# Patient Record
Sex: FEMALE | Race: BLACK OR AFRICAN AMERICAN | NOT HISPANIC OR LATINO
[De-identification: names, ages, dates, MRNs, and addresses within clinical notes are randomized per-mention and may not be internally consistent; named-entity substitution may affect disease eponyms.]

---

## 2017-11-14 ENCOUNTER — APPOINTMENT (OUTPATIENT)
Dept: OBGYN | Facility: CLINIC | Age: 67
End: 2017-11-14
Payer: MEDICARE

## 2017-11-14 ENCOUNTER — LABORATORY RESULT (OUTPATIENT)
Age: 67
End: 2017-11-14

## 2017-11-14 VITALS
BODY MASS INDEX: 24.59 KG/M2 | WEIGHT: 166 LBS | HEIGHT: 69 IN | SYSTOLIC BLOOD PRESSURE: 120 MMHG | DIASTOLIC BLOOD PRESSURE: 80 MMHG

## 2017-11-14 PROCEDURE — G0101: CPT

## 2018-01-05 LAB — CYTOLOGY CVX/VAG DOC THIN PREP: NORMAL

## 2018-02-02 ENCOUNTER — APPOINTMENT (OUTPATIENT)
Dept: GYNECOLOGIC ONCOLOGY | Facility: CLINIC | Age: 68
End: 2018-02-02
Payer: MEDICARE

## 2018-02-02 VITALS
BODY MASS INDEX: 24.44 KG/M2 | SYSTOLIC BLOOD PRESSURE: 166 MMHG | WEIGHT: 165 LBS | DIASTOLIC BLOOD PRESSURE: 97 MMHG | HEART RATE: 69 BPM | HEIGHT: 69 IN

## 2018-02-02 DIAGNOSIS — E03.9 HYPOTHYROIDISM, UNSPECIFIED: ICD-10-CM

## 2018-02-02 DIAGNOSIS — Z87.411 PERSONAL HISTORY OF VAGINAL DYSPLASIA: ICD-10-CM

## 2018-02-02 DIAGNOSIS — G25.0 ESSENTIAL TREMOR: ICD-10-CM

## 2018-02-02 DIAGNOSIS — Z01.419 ENCOUNTER FOR GYNECOLOGICAL EXAMINATION (GENERAL) (ROUTINE) W/OUT ABNORMAL FINDINGS: ICD-10-CM

## 2018-02-02 DIAGNOSIS — D51.0 VITAMIN B12 DEFICIENCY ANEMIA DUE TO INTRINSIC FACTOR DEFICIENCY: ICD-10-CM

## 2018-02-02 DIAGNOSIS — R87.621 ATYPICAL SQUAMOUS CELLS CANNOT EXCLUDE HIGH GRADE SQUAMOUS INTRAEPITHELIAL LESION ON CYTOLOGIC SMEAR OF VAGINA (ASC-H): ICD-10-CM

## 2018-02-02 PROCEDURE — 99203 OFFICE O/P NEW LOW 30 MIN: CPT | Mod: 25

## 2018-02-02 PROCEDURE — 57421 EXAM/BIOPSY OF VAG W/SCOPE: CPT

## 2018-02-02 RX ORDER — TIZANIDINE 4 MG/1
4 TABLET ORAL
Refills: 0 | Status: DISCONTINUED | COMMUNITY
End: 2018-02-02

## 2018-02-08 LAB — CORE LAB BIOPSY: NORMAL

## 2018-03-16 ENCOUNTER — OUTPATIENT (OUTPATIENT)
Dept: OUTPATIENT SERVICES | Facility: HOSPITAL | Age: 68
LOS: 1 days | End: 2018-03-16
Payer: MEDICARE

## 2018-03-16 VITALS
DIASTOLIC BLOOD PRESSURE: 84 MMHG | SYSTOLIC BLOOD PRESSURE: 132 MMHG | HEART RATE: 68 BPM | RESPIRATION RATE: 16 BRPM | TEMPERATURE: 98 F | WEIGHT: 169.98 LBS | HEIGHT: 69.5 IN

## 2018-03-16 DIAGNOSIS — R87.621 ATYPICAL SQUAMOUS CELLS CANNOT EXCLUDE HIGH GRADE SQUAMOUS INTRAEPITHELIAL LESION ON CYTOLOGIC SMEAR OF VAGINA (ASC-H): ICD-10-CM

## 2018-03-16 DIAGNOSIS — Z87.411 PERSONAL HISTORY OF VAGINAL DYSPLASIA: Chronic | ICD-10-CM

## 2018-03-16 DIAGNOSIS — M50.20 OTHER CERVICAL DISC DISPLACEMENT, UNSPECIFIED CERVICAL REGION: ICD-10-CM

## 2018-03-16 DIAGNOSIS — H35.60 RETINAL HEMORRHAGE, UNSPECIFIED EYE: ICD-10-CM

## 2018-03-16 DIAGNOSIS — E03.9 HYPOTHYROIDISM, UNSPECIFIED: ICD-10-CM

## 2018-03-16 LAB
BLD GP AB SCN SERPL QL: NEGATIVE — SIGNIFICANT CHANGE UP
BUN SERPL-MCNC: 17 MG/DL — SIGNIFICANT CHANGE UP (ref 7–23)
CALCIUM SERPL-MCNC: 9.3 MG/DL — SIGNIFICANT CHANGE UP (ref 8.4–10.5)
CHLORIDE SERPL-SCNC: 100 MMOL/L — SIGNIFICANT CHANGE UP (ref 98–107)
CO2 SERPL-SCNC: 28 MMOL/L — SIGNIFICANT CHANGE UP (ref 22–31)
CREAT SERPL-MCNC: 0.95 MG/DL — SIGNIFICANT CHANGE UP (ref 0.5–1.3)
GLUCOSE SERPL-MCNC: 88 MG/DL — SIGNIFICANT CHANGE UP (ref 70–99)
HCT VFR BLD CALC: 37.2 % — SIGNIFICANT CHANGE UP (ref 34.5–45)
HGB BLD-MCNC: 12.1 G/DL — SIGNIFICANT CHANGE UP (ref 11.5–15.5)
MCHC RBC-ENTMCNC: 30.6 PG — SIGNIFICANT CHANGE UP (ref 27–34)
MCHC RBC-ENTMCNC: 32.5 % — SIGNIFICANT CHANGE UP (ref 32–36)
MCV RBC AUTO: 94.2 FL — SIGNIFICANT CHANGE UP (ref 80–100)
NRBC # FLD: 0 — SIGNIFICANT CHANGE UP
PLATELET # BLD AUTO: 266 K/UL — SIGNIFICANT CHANGE UP (ref 150–400)
PMV BLD: 10.9 FL — SIGNIFICANT CHANGE UP (ref 7–13)
POTASSIUM SERPL-MCNC: 4.4 MMOL/L — SIGNIFICANT CHANGE UP (ref 3.5–5.3)
POTASSIUM SERPL-SCNC: 4.4 MMOL/L — SIGNIFICANT CHANGE UP (ref 3.5–5.3)
RBC # BLD: 3.95 M/UL — SIGNIFICANT CHANGE UP (ref 3.8–5.2)
RBC # FLD: 14.3 % — SIGNIFICANT CHANGE UP (ref 10.3–14.5)
RH IG SCN BLD-IMP: POSITIVE — SIGNIFICANT CHANGE UP
SODIUM SERPL-SCNC: 139 MMOL/L — SIGNIFICANT CHANGE UP (ref 135–145)
WBC # BLD: 6.6 K/UL — SIGNIFICANT CHANGE UP (ref 3.8–10.5)
WBC # FLD AUTO: 6.6 K/UL — SIGNIFICANT CHANGE UP (ref 3.8–10.5)

## 2018-03-16 PROCEDURE — 93010 ELECTROCARDIOGRAM REPORT: CPT

## 2018-03-16 RX ORDER — UBIDECARENONE 100 MG
200 CAPSULE ORAL
Qty: 0 | Refills: 0 | COMMUNITY

## 2018-03-16 NOTE — H&P PST ADULT - NEGATIVE NEUROLOGICAL SYMPTOMS
no vertigo/no loss of sensation/no facial palsy/no weakness/no difficulty walking/no confusion/no transient paralysis/no hemiparesis/no generalized seizures/no focal seizures/no syncope

## 2018-03-16 NOTE — H&P PST ADULT - PSH
Benign breast cyst in female  left 1977  H/O vaginal dysplasia  partial vaginectomy 2012  History of hysterectomy  1987  Normal vaginal delivery  x1

## 2018-03-16 NOTE — H&P PST ADULT - NEGATIVE OPHTHALMOLOGIC SYMPTOMS
no diplopia/no photophobia/no blurred vision R/no loss of vision R/no blurred vision L/no loss of vision L

## 2018-03-16 NOTE — H&P PST ADULT - PRIMARY CARE PROVIDER
Dr Osorio  Dr Osorio      Dr Elliott opthalmology [phone] Dr Osorio      Dr Elliott opthalmology     [phone]

## 2018-03-16 NOTE — H&P PST ADULT - NSANTHOSAYNRD_GEN_A_CORE
No. FEDER[place] screening performed.  STOP BANG Legend: 0-2 = LOW Risk; 3-4 = INTERMEDIATE Risk; 5-8 = HIGH Risk

## 2018-03-16 NOTE — H&P PST ADULT - RS GEN PE MLT RESP DETAILS PC
no chest wall tenderness/no rhonchi/respirations non-labored/no wheezes/clear to auscultation bilaterally/airway patent/good air movement/no rales/breath sounds equal

## 2018-03-16 NOTE — H&P PST ADULT - FAMILY HISTORY
Family history of stroke     Mother  Still living? Unknown  Family history of hypertension, Age at diagnosis: Age Unknown     Sibling  Still living? Unknown  Family history of hypertension, Age at diagnosis: Age Unknown
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
## 2018-03-16 NOTE — H&P PST ADULT - HISTORY OF PRESENT ILLNESS
67 year old female present to presurgical testing with diagnosis of atypical squamous cells cannot exclude high grade squamous intraepithelial lesion on cytologic smear of vagina (ASC-H) scheduled for partial vaginectomy, CO2 laser ablation of vaginal dysplasia for 3/28/18. Pt has a long standing hx of vaginal dysplasia, s/p partial vaginectomy in 2012, found abnormal cells on routine follow up examination, and recommended surgical intervention. Pt denies abnormal vaginal bleeding or pelvic pain.

## 2018-03-16 NOTE — H&P PST ADULT - PROBLEM SELECTOR PLAN 2
discussed with Dr Wong anesthesiologist. Pending opthalmology evaluation. Pt has an appt on 3/19/18.

## 2018-03-16 NOTE — H&P PST ADULT - NEGATIVE ENMT SYMPTOMS
no nose bleeds/no vertigo/no sinus symptoms/no throat pain/no dysphagia/no hearing difficulty/no tinnitus/no ear pain

## 2018-03-16 NOTE — H&P PST ADULT - MUSCULOSKELETAL
detailed exam no joint swelling/no joint erythema/normal strength/no joint warmth/no calf tenderness/ROM intact details…

## 2018-03-16 NOTE — H&P PST ADULT - PROBLEM SELECTOR PROBLEM 1
Atypical squamous cells cannot exclude high grade squamous intraepithelial lesion on cytologic smear of vagina (ASC-H)

## 2018-03-16 NOTE — H&P PST ADULT - PROBLEM SELECTOR PLAN 1
Pt is scheduled for partial vaginectomy, CO2 laser ablation of vaginal dysplasia for 3/28/18. Preop instructions, pepcid provided. Pt stated understanding. Pending medical evaluation due to Hx of HTN (not on medication) HLD pernicious anemia, rare palpitations, and last echo and stress test. Instructed to take nadolol on the morning of procedure with a sip of water.
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
## 2018-03-27 ENCOUNTER — TRANSCRIPTION ENCOUNTER (OUTPATIENT)
Age: 68
End: 2018-03-27

## 2018-03-28 ENCOUNTER — OUTPATIENT (OUTPATIENT)
Dept: OUTPATIENT SERVICES | Facility: HOSPITAL | Age: 68
LOS: 1 days | Discharge: HOME | End: 2018-03-28
Payer: MEDICARE

## 2018-03-28 ENCOUNTER — APPOINTMENT (OUTPATIENT)
Dept: GYNECOLOGIC ONCOLOGY | Facility: HOSPITAL | Age: 68
End: 2018-03-28

## 2018-03-28 ENCOUNTER — RESULT REVIEW (OUTPATIENT)
Age: 68
End: 2018-03-28

## 2018-03-28 VITALS
RESPIRATION RATE: 15 BRPM | HEART RATE: 69 BPM | DIASTOLIC BLOOD PRESSURE: 84 MMHG | SYSTOLIC BLOOD PRESSURE: 159 MMHG | TEMPERATURE: 98 F | OXYGEN SATURATION: 99 %

## 2018-03-28 VITALS
OXYGEN SATURATION: 100 % | WEIGHT: 98.11 LBS | RESPIRATION RATE: 14 BRPM | TEMPERATURE: 98 F | SYSTOLIC BLOOD PRESSURE: 183 MMHG | HEIGHT: 69 IN | DIASTOLIC BLOOD PRESSURE: 103 MMHG | HEART RATE: 73 BPM

## 2018-03-28 DIAGNOSIS — Z87.411 PERSONAL HISTORY OF VAGINAL DYSPLASIA: Chronic | ICD-10-CM

## 2018-03-28 DIAGNOSIS — R87.621 ATYPICAL SQUAMOUS CELLS CANNOT EXCLUDE HIGH GRADE SQUAMOUS INTRAEPITHELIAL LESION ON CYTOLOGIC SMEAR OF VAGINA (ASC-H): ICD-10-CM

## 2018-03-28 PROCEDURE — 57061 DESTRUCTION VAG LESIONS SMPL: CPT | Mod: 59,GC

## 2018-03-28 PROCEDURE — 57106 VAGNC PRTL RMVL VAG WALL: CPT | Mod: GC

## 2018-03-28 PROCEDURE — 88305 TISSUE EXAM BY PATHOLOGIST: CPT | Mod: 26

## 2018-03-28 RX ORDER — PYRIDOXINE HCL (VITAMIN B6) 100 MG
1 TABLET ORAL
Qty: 0 | Refills: 0 | COMMUNITY

## 2018-03-28 RX ORDER — UBIDECARENONE 100 MG
200 CAPSULE ORAL
Qty: 0 | Refills: 0 | COMMUNITY

## 2018-03-28 RX ORDER — LEVOTHYROXINE SODIUM 125 MCG
1 TABLET ORAL
Qty: 0 | Refills: 0 | COMMUNITY

## 2018-03-28 RX ORDER — SODIUM CHLORIDE 9 MG/ML
1000 INJECTION, SOLUTION INTRAVENOUS
Qty: 0 | Refills: 0 | Status: DISCONTINUED | OUTPATIENT
Start: 2018-03-28 | End: 2018-03-29

## 2018-03-28 RX ORDER — NADOLOL 80 MG/1
2 TABLET ORAL
Qty: 0 | Refills: 0 | COMMUNITY

## 2018-03-28 RX ORDER — ASPIRIN/CALCIUM CARB/MAGNESIUM 324 MG
1 TABLET ORAL
Qty: 0 | Refills: 0 | COMMUNITY

## 2018-03-28 RX ORDER — ACETAMINOPHEN 500 MG
1 TABLET ORAL
Qty: 0 | Refills: 0 | COMMUNITY

## 2018-03-28 RX ORDER — CHOLECALCIFEROL (VITAMIN D3) 125 MCG
2 CAPSULE ORAL
Qty: 0 | Refills: 0 | COMMUNITY

## 2018-03-28 RX ORDER — IBUPROFEN 200 MG
0 TABLET ORAL
Qty: 0 | Refills: 0 | COMMUNITY

## 2018-03-28 NOTE — BRIEF OPERATIVE NOTE - PROCEDURE
<<-----Click on this checkbox to enter Procedure CO2 laser surgery  03/28/2018  of vagina  Active  TZUBKINA  Vaginectomy, partial  03/28/2018  right  Active  TZUBKINA

## 2018-03-28 NOTE — ASU DISCHARGE PLAN (ADULT/PEDIATRIC). - COMMENTS
Surgical Unit will call you on the next business day to follow up. [facility] is open Monday - Friday.

## 2018-03-28 NOTE — ASU DISCHARGE PLAN (ADULT/PEDIATRIC). - ACTIVITY LEVEL
no tampons/nothing per vagina/no douching/no tub baths/no intercourse/1 month nothing per vagina/no tampons/no exercise/1 month/no tub baths/no douching/no heavy lifting/no sports/gym/no intercourse

## 2018-03-28 NOTE — ASU DISCHARGE PLAN (ADULT/PEDIATRIC). - NOTIFY
Inability to Tolerate Liquids or Foods/Pain not relieved by Medications/GYN Fever>100.4/Bleeding that does not stop Unable to Urinate/Pain not relieved by Medications/GYN Fever>100.4/Inability to Tolerate Liquids or Foods/Persistent Nausea and Vomiting/Bleeding that does not stop

## 2018-03-28 NOTE — ASU DISCHARGE PLAN (ADULT/PEDIATRIC). - MEDICATION SUMMARY - MEDICATIONS TO TAKE
I will START or STAY ON the medications listed below when I get home from the hospital:    cyonacobalamin injection monthly  -- Indication: For home med    Tylenol 325 mg oral capsule  -- 1 cap(s) by mouth 3 times a day  -- Indication: For pain med    Motrin  -- Indication: For pain med    Aspirin Enteric Coated 81 mg oral delayed release tablet  -- 1 tab(s) by mouth once a day. LASt dose 3/20/18  -- Indication: For home med    nadolol 20 mg oral tablet  -- 2 tab(s) by mouth once a day  -- Indication: For home med    CoQ10  -- 200 milligram(s) by mouth once a day. LAst dose 3/20/18  -- Indication: For home med    Synthroid 125 mcg (0.125 mg) oral tablet  -- 1 tab(s) by mouth once a day  -- Indication: For home med    Vitamin D3 2000 intl units oral tablet  -- 2 tab(s) by mouth once a day  -- Indication: For home med    Vitamin B6 100 mg oral tablet  -- 1 tab(s) by mouth once a day  -- Indication: For home med

## 2018-03-28 NOTE — ASU DISCHARGE PLAN (ADULT/PEDIATRIC). - NURSING INSTRUCTIONS
See medication reconciliation record  You were given IV Tylenol for pain management.  Please DO NOT take tylenol for the next 6-8 hours (after 10pm ). Please do not exceed 3000mg in 24hours.

## 2018-04-10 ENCOUNTER — APPOINTMENT (OUTPATIENT)
Dept: GYNECOLOGIC ONCOLOGY | Facility: CLINIC | Age: 68
End: 2018-04-10
Payer: MEDICARE

## 2018-04-10 VITALS
HEART RATE: 78 BPM | HEIGHT: 69 IN | TEMPERATURE: 98.6 F | SYSTOLIC BLOOD PRESSURE: 144 MMHG | WEIGHT: 166 LBS | BODY MASS INDEX: 24.59 KG/M2 | DIASTOLIC BLOOD PRESSURE: 87 MMHG

## 2018-04-10 PROCEDURE — 99024 POSTOP FOLLOW-UP VISIT: CPT | Mod: NC

## 2018-04-13 LAB — SURGICAL PATHOLOGY STUDY: SIGNIFICANT CHANGE UP

## 2018-07-17 PROBLEM — H35.60 RETINAL HEMORRHAGE, UNSPECIFIED EYE: Chronic | Status: ACTIVE | Noted: 2018-03-16

## 2018-10-23 PROBLEM — M54.30 SCIATICA, UNSPECIFIED SIDE: Chronic | Status: ACTIVE | Noted: 2018-03-16

## 2018-10-23 PROBLEM — R87.621 ATYPICAL SQUAMOUS CELLS CANNOT EXCLUDE HIGH GRADE SQUAMOUS INTRAEPITHELIAL LESION ON CYTOLOGIC SMEAR OF VAGINA (ASC-H): Chronic | Status: ACTIVE | Noted: 2018-03-16

## 2018-10-23 PROBLEM — M50.20 OTHER CERVICAL DISC DISPLACEMENT, UNSPECIFIED CERVICAL REGION: Chronic | Status: ACTIVE | Noted: 2018-03-16
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
## 2018-10-23 PROBLEM — I10 ESSENTIAL (PRIMARY) HYPERTENSION: Chronic | Status: ACTIVE | Noted: 2018-03-16

## 2018-10-23 PROBLEM — M51.26 OTHER INTERVERTEBRAL DISC DISPLACEMENT, LUMBAR REGION: Chronic | Status: ACTIVE | Noted: 2018-03-16

## 2018-10-26 ENCOUNTER — APPOINTMENT (OUTPATIENT)
Dept: GYNECOLOGIC ONCOLOGY | Facility: CLINIC | Age: 68
End: 2018-10-26
Payer: MEDICARE

## 2018-10-26 VITALS
HEART RATE: 90 BPM | BODY MASS INDEX: 25.92 KG/M2 | SYSTOLIC BLOOD PRESSURE: 139 MMHG | DIASTOLIC BLOOD PRESSURE: 84 MMHG | WEIGHT: 175 LBS | HEIGHT: 69 IN

## 2018-10-26 PROCEDURE — 57420 EXAM OF VAGINA W/SCOPE: CPT

## 2018-11-27 ENCOUNTER — APPOINTMENT (OUTPATIENT)
Dept: OBGYN | Facility: CLINIC | Age: 68
End: 2018-11-27
Payer: MEDICARE

## 2018-11-27 ENCOUNTER — LABORATORY RESULT (OUTPATIENT)
Age: 68
End: 2018-11-27

## 2018-11-27 VITALS
BODY MASS INDEX: 25.18 KG/M2 | HEIGHT: 69 IN | DIASTOLIC BLOOD PRESSURE: 100 MMHG | WEIGHT: 170 LBS | SYSTOLIC BLOOD PRESSURE: 172 MMHG

## 2018-11-27 PROCEDURE — 99213 OFFICE O/P EST LOW 20 MIN: CPT

## 2018-11-27 RX ORDER — MELOXICAM 15 MG/1
TABLET ORAL
Refills: 0 | Status: DISCONTINUED | COMMUNITY
End: 2018-11-27

## 2018-11-27 RX ORDER — LOSARTAN POTASSIUM 50 MG/1
50 TABLET, FILM COATED ORAL
Refills: 0 | Status: DISCONTINUED | COMMUNITY
End: 2018-11-27

## 2018-12-11 ENCOUNTER — MEDICATION RENEWAL (OUTPATIENT)
Age: 68
End: 2018-12-11

## 2018-12-11 DIAGNOSIS — Z86.19 PERSONAL HISTORY OF OTHER INFECTIOUS AND PARASITIC DISEASES: ICD-10-CM

## 2018-12-11 LAB — CYTOLOGY CVX/VAG DOC THIN PREP: NORMAL

## 2019-07-11 NOTE — H&P PST ADULT - NSWEIGHTCALCTOOLDRUG_GEN_A_CORE
Patient mother states she would like to know if patient could get in sooner for wrist pain prior to her current appointment on 8/7.    Patient's mother states she does scheduling for patient because patient is deaf.    [phone]  
Patient was seen at [facility] for wrist pain. Appointment no longer needed. Patient requested 8/9/2019 appointment be cancelled also as they will follow up with [facility] also.   
 used

## 2019-12-02 ENCOUNTER — LABORATORY RESULT (OUTPATIENT)
Age: 69
End: 2019-12-02

## 2019-12-02 ENCOUNTER — APPOINTMENT (OUTPATIENT)
Dept: OBGYN | Facility: CLINIC | Age: 69
End: 2019-12-02
Payer: MEDICARE

## 2019-12-02 VITALS
SYSTOLIC BLOOD PRESSURE: 130 MMHG | DIASTOLIC BLOOD PRESSURE: 80 MMHG | BODY MASS INDEX: 25.48 KG/M2 | WEIGHT: 172 LBS | HEIGHT: 69 IN

## 2019-12-02 DIAGNOSIS — I10 ESSENTIAL (PRIMARY) HYPERTENSION: ICD-10-CM

## 2019-12-02 PROCEDURE — G0101: CPT

## 2019-12-02 RX ORDER — CONJUGATED ESTROGENS 0.62 MG/G
0.62 CREAM VAGINAL
Qty: 30 | Refills: 2 | Status: DISCONTINUED | COMMUNITY
Start: 2018-04-10 | End: 2019-12-02

## 2019-12-02 RX ORDER — FLUCONAZOLE 150 MG/1
150 TABLET ORAL
Qty: 1 | Refills: 1 | Status: DISCONTINUED | COMMUNITY
Start: 2018-12-11 | End: 2019-12-02

## 2019-12-10 LAB — CYTOLOGY CVX/VAG DOC THIN PREP: ABNORMAL

## 2020-12-09 ENCOUNTER — APPOINTMENT (OUTPATIENT)
Dept: OBGYN | Facility: CLINIC | Age: 70
End: 2020-12-09
Payer: MEDICARE

## 2020-12-09 ENCOUNTER — LABORATORY RESULT (OUTPATIENT)
Age: 70
End: 2020-12-09

## 2020-12-09 VITALS
WEIGHT: 168 LBS | TEMPERATURE: 94.8 F | HEIGHT: 55 IN | BODY MASS INDEX: 38.88 KG/M2 | DIASTOLIC BLOOD PRESSURE: 78 MMHG | SYSTOLIC BLOOD PRESSURE: 120 MMHG

## 2020-12-09 DIAGNOSIS — Z01.419 ENCOUNTER FOR GYNECOLOGICAL EXAMINATION (GENERAL) (ROUTINE) W/OUT ABNORMAL FINDINGS: ICD-10-CM

## 2020-12-09 PROCEDURE — G0101: CPT

## 2020-12-16 PROBLEM — Z86.19 HISTORY OF CANDIDIASIS OF VAGINA: Status: RESOLVED | Noted: 2018-12-11 | Resolved: 2020-12-16

## 2020-12-18 LAB — CYTOLOGY CVX/VAG DOC THIN PREP: ABNORMAL

## 2021-12-06 ENCOUNTER — LABORATORY RESULT (OUTPATIENT)
Age: 71
End: 2021-12-06

## 2021-12-06 ENCOUNTER — APPOINTMENT (OUTPATIENT)
Dept: OBGYN | Facility: CLINIC | Age: 71
End: 2021-12-06
Payer: MEDICARE

## 2021-12-06 VITALS
BODY MASS INDEX: 23.74 KG/M2 | SYSTOLIC BLOOD PRESSURE: 120 MMHG | HEIGHT: 69.5 IN | DIASTOLIC BLOOD PRESSURE: 84 MMHG | WEIGHT: 164 LBS

## 2021-12-06 DIAGNOSIS — Z01.419 ENCOUNTER FOR GYNECOLOGICAL EXAMINATION (GENERAL) (ROUTINE) W/OUT ABNORMAL FINDINGS: ICD-10-CM

## 2021-12-06 PROCEDURE — G0101: CPT

## 2021-12-12 RX ORDER — LOSARTAN POTASSIUM 100 MG/1
TABLET, FILM COATED ORAL
Refills: 0 | Status: DISCONTINUED | COMMUNITY
End: 2021-12-12

## 2021-12-12 RX ORDER — OLMESARTAN MEDOXOMIL 40 MG/1
TABLET, FILM COATED ORAL
Refills: 0 | Status: ACTIVE | COMMUNITY

## 2022-07-16 ENCOUNTER — NON-APPOINTMENT (OUTPATIENT)
Age: 72
End: 2022-07-16

## 2022-07-22 ENCOUNTER — APPOINTMENT (OUTPATIENT)
Dept: ORTHOPEDIC SURGERY | Facility: CLINIC | Age: 72
End: 2022-07-22

## 2022-12-20 ENCOUNTER — APPOINTMENT (OUTPATIENT)
Dept: OBGYN | Facility: CLINIC | Age: 72
End: 2022-12-20

## 2022-12-20 DIAGNOSIS — R87.620 ATYPICAL SQUAMOUS CELLS OF UNDETERMINED SIGNIFICANCE ON CYTOLOGIC SMEAR OF VAGINA (ASC-US): ICD-10-CM

## 2022-12-20 DIAGNOSIS — Z11.51 ENCOUNTER FOR SCREENING FOR HUMAN PAPILLOMAVIRUS (HPV): ICD-10-CM

## 2022-12-20 PROCEDURE — G0101: CPT

## 2022-12-20 RX ORDER — NADOLOL 40 MG/1
TABLET ORAL
Refills: 0 | Status: DISCONTINUED | COMMUNITY
End: 2022-12-20

## 2022-12-24 RX ORDER — NADOLOL 80 MG/1
TABLET ORAL
Refills: 0 | Status: ACTIVE | COMMUNITY

## 2022-12-26 LAB — HPV HIGH+LOW RISK DNA PNL CVX: NOT DETECTED

## 2022-12-27 ENCOUNTER — APPOINTMENT (OUTPATIENT)
Dept: ORTHOPEDIC SURGERY | Facility: CLINIC | Age: 72
End: 2022-12-27

## 2022-12-27 VITALS — WEIGHT: 164 LBS | HEIGHT: 69.5 IN | BODY MASS INDEX: 23.74 KG/M2

## 2022-12-27 DIAGNOSIS — M51.26 OTHER INTERVERTEBRAL DISC DISPLACEMENT, LUMBAR REGION: ICD-10-CM

## 2022-12-27 DIAGNOSIS — M54.16 RADICULOPATHY, LUMBAR REGION: ICD-10-CM

## 2022-12-27 DIAGNOSIS — M48.062 SPINAL STENOSIS, LUMBAR REGION WITH NEUROGENIC CLAUDICATION: ICD-10-CM

## 2022-12-27 DIAGNOSIS — M47.816 SPONDYLOSIS W/OUT MYELOPATHY OR RADICULOPATHY, LUMBAR REGION: ICD-10-CM

## 2022-12-27 PROCEDURE — 99205 OFFICE O/P NEW HI 60 MIN: CPT | Mod: 25

## 2022-12-27 PROCEDURE — 20552 NJX 1/MLT TRIGGER POINT 1/2: CPT

## 2022-12-27 PROCEDURE — 72170 X-RAY EXAM OF PELVIS: CPT

## 2022-12-27 PROCEDURE — 72110 X-RAY EXAM L-2 SPINE 4/>VWS: CPT

## 2022-12-27 PROCEDURE — J3490N: CUSTOM

## 2022-12-27 NOTE — HISTORY OF PRESENT ILLNESS
[Lower back] : lower back [Gradual] : gradual [10] : 10 [8] : 8 [Burning] : burning [Dull/Aching] : dull/aching [Constant] : constant [Meds] : meds [de-identified] : 12/27/22:  71 yo F here with severe pain in the left hip and down the left leg - the right side not as bad - has had symptoms about a year - pain present both with rest and with activity \par \par EMG/NCS 91/22 - predominatently sensory polyneuopathy, mild acute right S1 radiculopathy \par \par MRi L spine 7/29/22 [facility] (we don’t have the disc) - grade 1 ds at L3-4 and L4-5 - progression of stenosis at L3-4 and L4-5 \par \par xrays today:\par L spine - grade 1 ds at L3-4 and L4-5 \par ap pelvis - narrowing of the hips \par \par tried mobic/tylenol - no relief \par tried PT\par No chiro/accupuncture\par No injections \par no use of cane/brace \par no prior spinal surgery\par \par retired \par \par hypothryoid/HTN\par No hx of cancer \par No loss of bb control  [] : no [FreeTextEntry5] :  CHRISSIE TSANG is a 72 year female who is here today for lower back pain. states she has been having pain on and off for several years. She was last seen in July for pain and had MRI done. Pain has been getting constant and worse since Friday 12/23/22.  [FreeTextEntry7] : down the legs, left > right

## 2022-12-27 NOTE — DISCUSSION/SUMMARY
[de-identified] : reviewed the case and the imaging \par discussion of tx optoins \par tpI TOLERATED WELL \par DS with stenosis from L3-5 \par rec paiN lesi/GABAPENTIN -sending the gabapentin today\par WOULD BE CANDIDATE FOR SURGERY FROM l3-5 POST BASED OPERATION LAMI/FUSION \par we discussed the surgery in detail today \par shes not ready for surgery \par would like to try more PT \par referral for pain management \par \par d/w the patient for laminecotomy and/or fusion at L3-5 \par \par We've discussed the surgery details including instrumentation and grafting options (local, allograft, ICBG, and biologics) as well as potential for complications including but not limited to pain, scar and infection. There is also a possibility for hardware complication such as malposition of hardware,hardware loosening, pullout, failure or fracture of bone, adjacent segment disease,pseudarthrosis, and need for future surgery. We discussed potential for injury to nerves, spinal cord or blood vessels, paralysis, blindness, need for transfusion, general anesthesia, allergic reaction, prolonged intubation,myocardial infarction, stroke, deep venous thrombosis, pulmonary embolus, and death.  Spinal fluid leak may occur and may require prolonged time in the hospital and also further surgical procedures including drain placement.  The patient understands these things and all questions are answered to his/her satisfaction.\par \par The patient will need a medical clearance and pre-admission testing prior to surgery\par \par We will use neuromonitoring in order to keep things as safe as possilble.\par \par The procedure does not come with a guarantee of success or of satisfaction on the patient's behalf \par \par \par At the surgeon's discretion he may call for assistance during the surgery or in the perioperative period \par \par \par \par

## 2023-04-24 ENCOUNTER — FORM ENCOUNTER (OUTPATIENT)
Age: 73
End: 2023-04-24

## 2023-04-24 ENCOUNTER — RESULT CHARGE (OUTPATIENT)
Age: 73
End: 2023-04-24

## 2023-04-24 ENCOUNTER — APPOINTMENT (OUTPATIENT)
Dept: ORTHOPEDIC SURGERY | Facility: CLINIC | Age: 73
End: 2023-04-24
Payer: MEDICARE

## 2023-04-24 DIAGNOSIS — M54.2 CERVICALGIA: ICD-10-CM

## 2023-04-24 PROCEDURE — 99214 OFFICE O/P EST MOD 30 MIN: CPT

## 2023-04-24 PROCEDURE — 72050 X-RAY EXAM NECK SPINE 4/5VWS: CPT

## 2023-04-24 NOTE — DISCUSSION/SUMMARY
[de-identified] : has pain neck/back/B shoulders \par  reviewed the case/imaging with her for the cervical/lumbar \par discussion of tx options \par  we discussed PT which she would like to continue with \par for the neck pain - referral [facility] \par WOULD REC f/u with pain management \par not interseted in surgery \par \par for her shoulders she could f/u with shoulder ervice  if needed
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
## 2023-04-25 ENCOUNTER — APPOINTMENT (OUTPATIENT)
Dept: MRI IMAGING | Facility: CLINIC | Age: 73
End: 2023-04-25
Payer: MEDICARE

## 2023-04-25 PROCEDURE — 72141 MRI NECK SPINE W/O DYE: CPT | Mod: MH

## 2023-05-22 ENCOUNTER — APPOINTMENT (OUTPATIENT)
Dept: ORTHOPEDIC SURGERY | Facility: CLINIC | Age: 73
End: 2023-05-22
Payer: MEDICARE

## 2023-05-22 VITALS — HEIGHT: 69.5 IN | WEIGHT: 164 LBS | BODY MASS INDEX: 23.74 KG/M2

## 2023-05-22 DIAGNOSIS — M47.812 SPONDYLOSIS W/OUT MYELOPATHY OR RADICULOPATHY, CERVICAL REGION: ICD-10-CM

## 2023-05-22 DIAGNOSIS — M43.16 SPONDYLOLISTHESIS, LUMBAR REGION: ICD-10-CM

## 2023-05-22 DIAGNOSIS — M48.02 SPINAL STENOSIS, CERVICAL REGION: ICD-10-CM

## 2023-05-22 PROCEDURE — 99214 OFFICE O/P EST MOD 30 MIN: CPT

## 2023-05-22 NOTE — DISCUSSION/SUMMARY
[de-identified] : reviewed the case and the imaging with her \par \par has pain neck/back/B shoulders \par  reviewed the case/imaging with her for the cervical/lumbar \par discussion of tx options \par  we discussed PT which she would like to continue with \par for the neck pain - referral to [facility] \par WOULD REC f/u with pain management \par not interseted in surgery \par \par in the cervical spine we discussed the cervical spinal stenosis today and the tx options for that \par \par for her shoulders she could f/u with shoulder ervice  if needed
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
## 2024-01-24 ENCOUNTER — APPOINTMENT (OUTPATIENT)
Dept: ORTHOPEDIC SURGERY | Facility: CLINIC | Age: 74
End: 2024-01-24
Payer: MEDICARE

## 2024-01-24 VITALS — BODY MASS INDEX: 23.74 KG/M2 | HEIGHT: 69.5 IN | WEIGHT: 164 LBS

## 2024-01-24 DIAGNOSIS — M75.100 UNSPECIFIED ROTATOR CUFF TEAR OR RUPTURE OF UNSPECIFIED SHOULDER, NOT SPECIFIED AS TRAUMATIC: ICD-10-CM

## 2024-01-24 DIAGNOSIS — S46.011A STRAIN OF MUSCLE(S) AND TENDON(S) OF THE ROTATOR CUFF OF RIGHT SHOULDER, INITIAL ENCOUNTER: ICD-10-CM

## 2024-01-24 PROCEDURE — 99214 OFFICE O/P EST MOD 30 MIN: CPT

## 2024-01-30 NOTE — DATA REVIEWED
[MRI] : MRI [Right] : of the right [Shoulder] : shoulder [Report was reviewed and noted in the chart] : The report was reviewed and noted in the chart [I independently reviewed and interpreted images and report] : I independently reviewed and interpreted images and report [I reviewed the films/CD] : I reviewed the films/CD [FreeTextEntry1] : MRI right shoulder reveals evidence of rotator cuff tearing involving the supraspinatus and infraspinatus tendon, moderate glenohumeral degenerative changes, mild AC OA.

## 2024-01-30 NOTE — DISCUSSION/SUMMARY
[de-identified] : MRI right shoulder reveals evidence of rotator cuff tearing involving the supraspinatus and infraspinatus tendon, moderate glenohumeral degenerative changes, mild AC OA.   She has been treated with a steroid injection to the right shoulder in 11/2023 with outside physician.   We reviewed the MRI findings. We discussed treatment options, both operative and non-operative considering the patients age. Advised that there are risks associated with arthroscopic surgery considering her pre-existing degenerative changes. We discussed non-operative management of tearing to include formal therapy and injections however with this comes risks of growing stiff and further damage, progression of tearing. Discussed risks associated with multiple steroid injections into rotator cuff tendons.   The patient elects to defer any surgical intervention as she is doing well s/p original steroid injection. She will evaluate response to conservative management of RTC tear and then consider surgery.    Start physical therapy. Work on strengthening.   Follow up in 4 weeks, possible SA celestone injection if pain/discomfort persists.

## 2024-01-30 NOTE — PHYSICAL EXAM
[Right] : right shoulder [Sitting] : sitting [] : no erythema [TWNoteComboBox4] : passive forward flexion 165 degrees [TWNoteComboBox9] : passive abduction 165 degrees

## 2024-01-30 NOTE — HISTORY OF PRESENT ILLNESS
[de-identified] : Here for pain in the right shoulder for months. No injury to the area. Had been treating with Dr Muñoz for it initially and had an cortisone injection in the shoulder a couple of weeks ago, also brought MRI report as well from Dr Muñoz. Has helped but wanted to have another opinion with Dr Alarcon as he had done her left shoulder surgery in the past. Still having pain with her motion and when reaching behind the back as well

## 2024-02-14 ENCOUNTER — APPOINTMENT (OUTPATIENT)
Dept: OBGYN | Facility: CLINIC | Age: 74
End: 2024-02-14
Payer: MEDICARE

## 2024-02-14 VITALS
BODY MASS INDEX: 22.3 KG/M2 | DIASTOLIC BLOOD PRESSURE: 80 MMHG | WEIGHT: 154 LBS | HEIGHT: 69.5 IN | SYSTOLIC BLOOD PRESSURE: 150 MMHG

## 2024-02-14 DIAGNOSIS — D07.2 CARCINOMA IN SITU OF VAGINA: ICD-10-CM

## 2024-02-14 DIAGNOSIS — Z86.73 PERSONAL HISTORY OF TRANSIENT ISCHEMIC ATTACK (TIA), AND CEREBRAL INFARCTION W/OUT RESIDUAL DEFICITS: ICD-10-CM

## 2024-02-14 DIAGNOSIS — Z01.411 ENCOUNTER FOR GYNECOLOGICAL EXAMINATION (GENERAL) (ROUTINE) WITH ABNORMAL FINDINGS: ICD-10-CM

## 2024-02-14 PROCEDURE — G0101: CPT

## 2024-02-14 RX ORDER — BEMPEDOIC ACID 180 MG/1
TABLET, FILM COATED ORAL
Refills: 0 | Status: ACTIVE | COMMUNITY

## 2024-02-14 RX ORDER — CLOPIDOGREL BISULFATE 300 MG/1
TABLET, FILM COATED ORAL
Refills: 0 | Status: ACTIVE | COMMUNITY

## 2024-02-14 RX ORDER — GABAPENTIN 300 MG/1
300 CAPSULE ORAL
Qty: 60 | Refills: 0 | Status: DISCONTINUED | COMMUNITY
Start: 2022-12-27 | End: 2024-02-14

## 2024-02-14 RX ORDER — EVOLOCUMAB 140 MG/ML
INJECTION, SOLUTION SUBCUTANEOUS
Refills: 0 | Status: ACTIVE | COMMUNITY

## 2024-02-14 RX ORDER — CHROMIUM 200 MCG
TABLET ORAL
Refills: 0 | Status: ACTIVE | COMMUNITY

## 2024-02-18 PROBLEM — Z86.73 HISTORY OF TIA (TRANSIENT ISCHEMIC ATTACK): Status: RESOLVED | Noted: 2024-02-14 | Resolved: 2024-02-18

## 2024-02-18 NOTE — PHYSICAL EXAM
[Awake] : awake [Alert] : alert [Soft] : soft [Oriented x3] : oriented to person, place, and time [Normal] : external genitalia [Atrophy] : atrophy [Absent] : absent [Acute Distress] : no acute distress [Mass] : no breast mass [Nipple Discharge] : no nipple discharge [Axillary LAD] : no axillary lymphadenopathy [Tender] : non tender [Ovarian Mass (___ Cm)] : there were no adnexal masses

## 2024-02-21 LAB — CYTOLOGY CVX/VAG DOC THIN PREP: ABNORMAL

## 2025-02-25 ENCOUNTER — APPOINTMENT (OUTPATIENT)
Dept: OBGYN | Facility: CLINIC | Age: 75
End: 2025-02-25
Payer: MEDICARE

## 2025-02-25 VITALS
BODY MASS INDEX: 23.11 KG/M2 | WEIGHT: 156 LBS | DIASTOLIC BLOOD PRESSURE: 83 MMHG | SYSTOLIC BLOOD PRESSURE: 137 MMHG | HEIGHT: 69 IN

## 2025-02-25 DIAGNOSIS — Z01.411 ENCOUNTER FOR GYNECOLOGICAL EXAMINATION (GENERAL) (ROUTINE) WITH ABNORMAL FINDINGS: ICD-10-CM

## 2025-02-25 DIAGNOSIS — D07.2 CARCINOMA IN SITU OF VAGINA: ICD-10-CM

## 2025-02-25 PROCEDURE — 99213 OFFICE O/P EST LOW 20 MIN: CPT

## 2025-03-01 LAB — CYTOLOGY CVX/VAG DOC THIN PREP: NORMAL
